# Patient Record
Sex: FEMALE | Race: WHITE | NOT HISPANIC OR LATINO | URBAN - METROPOLITAN AREA
[De-identification: names, ages, dates, MRNs, and addresses within clinical notes are randomized per-mention and may not be internally consistent; named-entity substitution may affect disease eponyms.]

---

## 2018-06-14 ENCOUNTER — OFFICE VISIT (OUTPATIENT)
Dept: URBAN - METROPOLITAN AREA CLINIC 17 | Facility: CLINIC | Age: 78
End: 2018-06-14
Payer: MEDICARE

## 2018-06-14 DIAGNOSIS — Z79.899 OTHER LONG TERM (CURRENT) DRUG THERAPY: ICD-10-CM

## 2018-06-14 DIAGNOSIS — Z90.01 ACQUIRED ABSENCE OF EYE: ICD-10-CM

## 2018-06-14 DIAGNOSIS — T37.2X5A ADVERSE EFFECT OF ANTIMALARIALS AND DRUGS ACTING ON OTHER BLOOD PROTOZOA, INITIAL ENCOUNTER: ICD-10-CM

## 2018-06-14 DIAGNOSIS — L93.0 DISCOID LUPUS ERYTHEMATOSUS: Primary | ICD-10-CM

## 2018-06-14 DIAGNOSIS — H25.811 COMBINED FORMS OF AGE-RELATED CATARACT, RIGHT EYE: ICD-10-CM

## 2018-06-14 PROCEDURE — 92014 COMPRE OPH EXAM EST PT 1/>: CPT | Performed by: OPTOMETRIST

## 2018-06-14 PROCEDURE — 92134 CPTRZ OPH DX IMG PST SGM RTA: CPT | Performed by: OPTOMETRIST

## 2018-06-14 ASSESSMENT — INTRAOCULAR PRESSURE: OD: 11

## 2018-06-14 NOTE — IMPRESSION/PLAN
Impression: Combined forms of age-related cataract, right eye: H25.811. Plan: Cataracts account for the patient's complaints. No treatment currently recommended. The patient will monitor vision changes and contact us with any decrease in vision.

## 2018-06-14 NOTE — IMPRESSION/PLAN
Impression: Other long term current drug therapy: Z79.899. **Pt is trying to stop taking plaquenil Plan: Discussed condition and results with patient. Will continue to monitor closely. Obtained Mac OCT today, results show no Plaquenil toxicity OU.

## 2018-06-14 NOTE — IMPRESSION/PLAN
Impression: Diagnosis: Acquired absence of eye. Code: Z90.01. Plan: Extensive white d/c and protein deposits present.

## 2018-06-14 NOTE — IMPRESSION/PLAN
Impression: Discoid lupus erythematosus: L93.0. Plan: Discussed condition and results with patient. Will continue to monitor closely. Obtained Mac OCT today, results show no Plaquenil toxicity OU.

## 2018-06-14 NOTE — IMPRESSION/PLAN
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 Plan: Discussed diagnosis in detail with patient.